# Patient Record
Sex: MALE | Race: WHITE | NOT HISPANIC OR LATINO | Employment: FULL TIME | URBAN - METROPOLITAN AREA
[De-identification: names, ages, dates, MRNs, and addresses within clinical notes are randomized per-mention and may not be internally consistent; named-entity substitution may affect disease eponyms.]

---

## 2017-11-11 ENCOUNTER — HOSPITAL ENCOUNTER (EMERGENCY)
Facility: CLINIC | Age: 32
Discharge: HOME | End: 2017-11-12
Attending: EMERGENCY MEDICINE | Admitting: EMERGENCY MEDICINE
Payer: COMMERCIAL

## 2017-11-11 DIAGNOSIS — R52 PAIN: ICD-10-CM

## 2017-11-11 PROCEDURE — 93010 ELECTROCARDIOGRAM REPORT: CPT | Mod: Z6 | Performed by: EMERGENCY MEDICINE

## 2017-11-11 PROCEDURE — 93005 ELECTROCARDIOGRAM TRACING: CPT | Performed by: EMERGENCY MEDICINE

## 2017-11-11 PROCEDURE — 99285 EMERGENCY DEPT VISIT HI MDM: CPT | Mod: 25 | Performed by: EMERGENCY MEDICINE

## 2017-11-11 PROCEDURE — 99284 EMERGENCY DEPT VISIT MOD MDM: CPT | Mod: 25 | Performed by: EMERGENCY MEDICINE

## 2017-11-11 PROCEDURE — 96361 HYDRATE IV INFUSION ADD-ON: CPT | Performed by: EMERGENCY MEDICINE

## 2017-11-11 NOTE — ED AVS SNAPSHOT
[facility], [place], Emergency Department    [address] [place] [address]    Phone:  [phone]                                       Kenton Greco   MRN: [id number]    Department:  [facility], Emergency Department   Date of Visit:  11/11/2017           After Visit Summary Signature Page     I have received my discharge instructions, and my questions have been answered. I have discussed any challenges I see with this plan with the nurse or doctor.    ..........................................................................................................................................  Patient/Patient Representative Signature      ..........................................................................................................................................  Patient Representative Print Name and Relationship to Patient    ..................................................               ................................................  Date                                            Time    ..........................................................................................................................................  Reviewed by Signature/Title    ...................................................              ..............................................  Date                                                            Time

## 2017-11-11 NOTE — ED AVS SNAPSHOT
[facility], Emergency Department    [address] [address]    Phone:  [phone]                                       Kenton Greco   MRN: [id number]    Department:  [facility], Emergency Department   Date of Visit:  11/11/2017           Patient Information     Date Of Birth          1985        Your diagnoses for this visit were:     Pain right low ribcage/R upper abdominal discomfort       You were seen by Venessa Fernando MD.        Discharge Instructions       You have been seen in the ER for right sided ribcage pain/right upper abdominal pain.  You have had blood tests, an EKG, chest X ray and an ultrasound, all of which appear to be normal.      It is not precisely clear what is causing your symptoms at this time, though it could be due to muscle strain.  Keep an eye out for a rash that could develop - this would look like little blisters that develop in a line or a strip. If you see this, it could mean that you are developing shingles.  Follow up with your clinic right away if you see this so they can start you on anti-viral treatment.    You have decided not to wait for the urine test result.    If you develop fevers, uncontrolled symptoms, or repeated vomiting, come back to the ER or see your clinic right away.     24 Hour Appointment Hotline       To make an appointment at any [facility], call [phone] ([phone]). If you don't have a family doctor or clinic, we will help you find one. [facility] are conveniently located to serve the needs of you and your family.             Review of your medicines      Notice     You have not been prescribed any medications.            Procedures and tests performed during your visit     Abdomen US, limited (RUQ only)    CBC with platelets differential    Chest XR,  PA & LAT    Comprehensive metabolic panel    EKG 12 lead    Lipase    UA with Microscopic reflex to Culture      Orders Needing Specimen Collection  "    None      Pending Results     Date and Time Order Name Status Description    2017 Abdomen US, limited (RUQ only) Preliminary     2017 EKG 12 lead Preliminary     2017 Chest XR,  PA & LAT Preliminary             Pending Culture Results     No orders found for last 3 day(s).            Pending Results Instructions     If you had any lab results that were not finalized at the time of your Discharge, you can call the ED Lab Result RN at [phone]. You will be contacted by this team for any positive Lab results or changes in treatment. The nurses are available 7 days a week from 10A to 6:30P.  You can leave a message 24 hours per day and they will return your call.        Thank you for choosing [facility]       Thank you for choosing [facility] for your care. Our goal is always to provide you with excellent care. Hearing back from our patients is one way we can continue to improve our services. Please take a few minutes to complete the written survey that you may receive in the mail after you visit with us. Thank you!        [employer] Information     [employer] lets you send messages to your doctor, view your test results, renew your prescriptions, schedule appointments and more. To sign up, go to [email or web address] . Click on \"Log in\" on the left side of the screen, which will take you to the Welcome page. Then click on \"Sign up Now\" on the right side of the page.     You will be asked to enter the access code listed below, as well as some personal information. Please follow the directions to create your username and password.     Your access code is: [email or web address]  Expires: 2/10/2018  2:48 AM     Your access code will  in 90 days. If you need help or a new code, please call your [facility] clinic or [phone].        Care EveryWhere ID     This is your Care EveryWhere ID. This could be used by other organizations to access your [facility] medical records  [id number]      "   Equal Access to Services     LAUREN ARREDONDO : Loretta Kimbrough, raman gatica, linda vang. So [facility] [phone].    ATENCIÓN: Si habla español, tiene a jane disposición servicios gratuitos de asistencia lingüística. Llame al [phone].    We comply with applicable federal civil rights laws and [place] laws. We do not discriminate on the basis of race, color, national origin, age, disability, sex, sexual orientation, or gender identity.            After Visit Summary       This is your record. Keep this with you and show to your community pharmacist(s) and doctor(s) at your next visit.

## 2017-11-12 ENCOUNTER — APPOINTMENT (OUTPATIENT)
Dept: ULTRASOUND IMAGING | Facility: CLINIC | Age: 32
End: 2017-11-12
Attending: EMERGENCY MEDICINE
Payer: COMMERCIAL

## 2017-11-12 ENCOUNTER — APPOINTMENT (OUTPATIENT)
Dept: GENERAL RADIOLOGY | Facility: CLINIC | Age: 32
End: 2017-11-12
Attending: EMERGENCY MEDICINE
Payer: COMMERCIAL

## 2017-11-12 VITALS
RESPIRATION RATE: 18 BRPM | BODY MASS INDEX: 30.48 KG/M2 | TEMPERATURE: 97.7 F | DIASTOLIC BLOOD PRESSURE: 80 MMHG | WEIGHT: 225 LBS | HEART RATE: 82 BPM | OXYGEN SATURATION: 95 % | SYSTOLIC BLOOD PRESSURE: 112 MMHG | HEIGHT: 72 IN

## 2017-11-12 LAB
ALBUMIN SERPL-MCNC: 4 G/DL (ref 3.4–5)
ALBUMIN UR-MCNC: NEGATIVE MG/DL
ALP SERPL-CCNC: 79 U/L (ref 40–150)
ALT SERPL W P-5'-P-CCNC: 41 U/L (ref 0–70)
ANION GAP SERPL CALCULATED.3IONS-SCNC: 5 MMOL/L (ref 3–14)
APPEARANCE UR: CLEAR
AST SERPL W P-5'-P-CCNC: 22 U/L (ref 0–45)
BASOPHILS # BLD AUTO: 0 10E9/L (ref 0–0.2)
BASOPHILS NFR BLD AUTO: 0.2 %
BILIRUB SERPL-MCNC: 0.4 MG/DL (ref 0.2–1.3)
BILIRUB UR QL STRIP: NEGATIVE
BUN SERPL-MCNC: 13 MG/DL (ref 7–30)
CALCIUM SERPL-MCNC: 9 MG/DL (ref 8.5–10.1)
CHLORIDE SERPL-SCNC: 104 MMOL/L (ref 94–109)
CO2 SERPL-SCNC: 31 MMOL/L (ref 20–32)
COLOR UR AUTO: YELLOW
CREAT SERPL-MCNC: 0.91 MG/DL (ref 0.66–1.25)
DIFFERENTIAL METHOD BLD: NORMAL
EOSINOPHIL # BLD AUTO: 0.1 10E9/L (ref 0–0.7)
EOSINOPHIL NFR BLD AUTO: 1.7 %
ERYTHROCYTE [DISTWIDTH] IN BLOOD BY AUTOMATED COUNT: 12.7 % (ref 10–15)
GFR SERPL CREATININE-BSD FRML MDRD: >90 ML/MIN/1.7M2
GLUCOSE SERPL-MCNC: 89 MG/DL (ref 70–99)
GLUCOSE UR STRIP-MCNC: NEGATIVE MG/DL
HCT VFR BLD AUTO: 45 % (ref 40–53)
HGB BLD-MCNC: 15.3 G/DL (ref 13.3–17.7)
HGB UR QL STRIP: NEGATIVE
IMM GRANULOCYTES # BLD: 0 10E9/L (ref 0–0.4)
IMM GRANULOCYTES NFR BLD: 0.1 %
INTERPRETATION ECG - MUSE: NORMAL
KETONES UR STRIP-MCNC: NEGATIVE MG/DL
LEUKOCYTE ESTERASE UR QL STRIP: NEGATIVE
LIPASE SERPL-CCNC: 108 U/L (ref 73–393)
LYMPHOCYTES # BLD AUTO: 2.9 10E9/L (ref 0.8–5.3)
LYMPHOCYTES NFR BLD AUTO: 35.9 %
MCH RBC QN AUTO: 30 PG (ref 26.5–33)
MCHC RBC AUTO-ENTMCNC: 34 G/DL (ref 31.5–36.5)
MCV RBC AUTO: 88 FL (ref 78–100)
MONOCYTES # BLD AUTO: 0.7 10E9/L (ref 0–1.3)
MONOCYTES NFR BLD AUTO: 8.5 %
MUCOUS THREADS #/AREA URNS LPF: PRESENT /LPF
NEUTROPHILS # BLD AUTO: 4.3 10E9/L (ref 1.6–8.3)
NEUTROPHILS NFR BLD AUTO: 53.6 %
NITRATE UR QL: NEGATIVE
NRBC # BLD AUTO: 0 10*3/UL
NRBC BLD AUTO-RTO: 0 /100
PH UR STRIP: 6.5 PH (ref 5–7)
PLATELET # BLD AUTO: 213 10E9/L (ref 150–450)
POTASSIUM SERPL-SCNC: 3.8 MMOL/L (ref 3.4–5.3)
PROT SERPL-MCNC: 7.4 G/DL (ref 6.8–8.8)
RBC # BLD AUTO: 5.1 10E12/L (ref 4.4–5.9)
RBC #/AREA URNS AUTO: 1 /HPF (ref 0–2)
SODIUM SERPL-SCNC: 139 MMOL/L (ref 133–144)
SOURCE: ABNORMAL
SP GR UR STRIP: 1.01 (ref 1–1.03)
UROBILINOGEN UR STRIP-MCNC: NORMAL MG/DL (ref 0–2)
WBC # BLD AUTO: 8.1 10E9/L (ref 4–11)
WBC #/AREA URNS AUTO: 1 /HPF (ref 0–2)

## 2017-11-12 PROCEDURE — 71020 XR CHEST 2 VW: CPT

## 2017-11-12 PROCEDURE — 76705 ECHO EXAM OF ABDOMEN: CPT

## 2017-11-12 PROCEDURE — 96374 THER/PROPH/DIAG INJ IV PUSH: CPT | Performed by: EMERGENCY MEDICINE

## 2017-11-12 PROCEDURE — 25000128 H RX IP 250 OP 636: Performed by: EMERGENCY MEDICINE

## 2017-11-12 PROCEDURE — 85025 COMPLETE CBC W/AUTO DIFF WBC: CPT | Performed by: EMERGENCY MEDICINE

## 2017-11-12 PROCEDURE — 80053 COMPREHEN METABOLIC PANEL: CPT | Performed by: EMERGENCY MEDICINE

## 2017-11-12 PROCEDURE — 83690 ASSAY OF LIPASE: CPT | Performed by: EMERGENCY MEDICINE

## 2017-11-12 PROCEDURE — 81001 URINALYSIS AUTO W/SCOPE: CPT | Performed by: EMERGENCY MEDICINE

## 2017-11-12 RX ORDER — KETOROLAC TROMETHAMINE 30 MG/ML
30 INJECTION, SOLUTION INTRAMUSCULAR; INTRAVENOUS ONCE
Status: COMPLETED | OUTPATIENT
Start: 2017-11-12 | End: 2017-11-12

## 2017-11-12 RX ADMIN — KETOROLAC TROMETHAMINE 30 MG: 30 INJECTION, SOLUTION INTRAMUSCULAR at 00:33

## 2017-11-12 RX ADMIN — SODIUM CHLORIDE 500 ML: 9 INJECTION, SOLUTION INTRAVENOUS at 00:33

## 2017-11-12 ASSESSMENT — ENCOUNTER SYMPTOMS
CHILLS: 0
FEVER: 0
RHINORRHEA: 0
COUGH: 0
ABDOMINAL PAIN: 1
VOMITING: 0
MYALGIAS: 0
SHORTNESS OF BREATH: 0
DIARRHEA: 0
WHEEZING: 0
NAUSEA: 0
SORE THROAT: 0

## 2017-11-12 NOTE — ED PROVIDER NOTES
History   No chief complaint on file.    HPI  Kenton Greco is a 32 year old male with a history of anxiety, who presents to the Emergency Department with right sided ribcage pain/right upper abdominal pain. The patient complains of intermittent right lateral side pain that radiates to the right upper abdomen, that is more severe with deep breaths, for the past couple of days. He notes that the pain is dull currently. The patient reports that he felt uncomfortable earlier today while sitting during a play, but noticed that the pain progressively got worse once he got up to leave, which prompted his arrival to the ED. However it seems to have spontaneously abated now and he feels much improved. He denies any nausea, vomiting, diarrhea, fever, chills, rashes, central or left sided chest pain, history of any surgeries or history of similar symptoms. He reports that his last bowel movement was today around 4pm, however, he feels the onset of constipation. Of note, the patient reports of a family history of gall stones.     I have reviewed the Medications, Allergies, Past Medical and Surgical History, and Social History in the [email or web address] system.  PAST MEDICAL HISTORY:   Past Medical History:   Diagnosis Date     Anxiety        PAST SURGICAL HISTORY:   Past Surgical History:   Procedure Laterality Date     DENTAL SURGERY       ORTHOPEDIC SURGERY         FAMILY HISTORY: No family history on file.    SOCIAL HISTORY:   Social History   Substance Use Topics     Smoking status: Former Smoker     Smokeless tobacco: Not on file     Alcohol use Yes      Comment: sometimes     No current facility-administered medications for this encounter.      No current outpatient prescriptions on file.      Not on File      Review of Systems   Constitutional: Negative for chills and fever.   HENT: Negative for rhinorrhea and sore throat.    Respiratory: Negative for cough, shortness of breath and wheezing.    Cardiovascular: Negative for chest  pain.        Right lateral ribcage pain   Gastrointestinal: Positive for abdominal pain. Negative for diarrhea, nausea and vomiting.   Musculoskeletal: Negative for myalgias.   Skin: Negative for rash.   All other systems reviewed and are negative.      Physical Exam   BP: (!) 131/91  Pulse: 82  Temp: 97.7  F (36.5  C)  Resp: 18  Height: 182.9 cm (6')  Weight: 102.1 kg (225 lb)  SpO2: 99 %      Physical Exam   Constitutional: No distress.   Well appearing adult male, alert, cooperative, NAD   HENT:   Head: Normocephalic and atraumatic.   Mouth/Throat: Oropharynx is clear and moist. No oropharyngeal exudate.   Eyes: Pupils are equal, round, and reactive to light. No scleral icterus.   Cardiovascular: Normal rate, regular rhythm, normal heart sounds and intact distal pulses.    No murmur heard.  Pulmonary/Chest: Effort normal and breath sounds normal. No respiratory distress. He has no wheezes. He has no rales.   Abdominal: Soft. Bowel sounds are normal. He exhibits no distension. There is no tenderness. There is no rebound.   Slightly obese, soft, no focal TTP upon abdominal exam (though patient reports the RUQ is the location of the discomfort, but palpation does not worsen this)   Musculoskeletal: He exhibits no edema or tenderness.   Skin: Skin is warm. No rash noted. He is not diaphoretic.   Psychiatric:   Reactive affect, joking   Nursing note and vitals reviewed.      ED Course     ED Course     Procedures             EKG Interpretation:      Interpreted by Venessa Fernando  Time reviewed: 0030  Symptoms at time of EKG: RUQ/low chest pain   Rhythm: normal sinus   Rate: normal  Axis: normal  Ectopy: none  Conduction: normal  ST Segments/ T Waves: No ST-T wave changes  Q Waves: none  Comparison to prior: Unchanged    Clinical Impression: normal EKG      Critical Care time:  none           Results for orders placed or performed during the hospital encounter of 11/11/17 (from the past 24 hour(s))   CBC with  platelets differential   Result Value Ref Range    WBC 8.1 4.0 - 11.0 10e9/L    RBC Count 5.10 4.4 - 5.9 10e12/L    Hemoglobin 15.3 13.3 - 17.7 g/dL    Hematocrit 45.0 40.0 - 53.0 %    MCV 88 78 - 100 fl    MCH 30.0 26.5 - 33.0 pg    MCHC 34.0 31.5 - 36.5 g/dL    RDW 12.7 10.0 - 15.0 %    Platelet Count 213 150 - 450 10e9/L    Diff Method Automated Method     % Neutrophils 53.6 %    % Lymphocytes 35.9 %    % Monocytes 8.5 %    % Eosinophils 1.7 %    % Basophils 0.2 %    % Immature Granulocytes 0.1 %    Nucleated RBCs 0 0 /100    Absolute Neutrophil 4.3 1.6 - 8.3 10e9/L    Absolute Lymphocytes 2.9 0.8 - 5.3 10e9/L    Absolute Monocytes 0.7 0.0 - 1.3 10e9/L    Absolute Eosinophils 0.1 0.0 - 0.7 10e9/L    Absolute Basophils 0.0 0.0 - 0.2 10e9/L    Abs Immature Granulocytes 0.0 0 - 0.4 10e9/L    Absolute Nucleated RBC 0.0    Comprehensive metabolic panel   Result Value Ref Range    Sodium 139 133 - 144 mmol/L    Potassium 3.8 3.4 - 5.3 mmol/L    Chloride 104 94 - 109 mmol/L    Carbon Dioxide 31 20 - 32 mmol/L    Anion Gap 5 3 - 14 mmol/L    Glucose 89 70 - 99 mg/dL    Urea Nitrogen 13 7 - 30 mg/dL    Creatinine 0.91 0.66 - 1.25 mg/dL    GFR Estimate >90 >60 mL/min/1.7m2    GFR Estimate If Black >90 >60 mL/min/1.7m2    Calcium 9.0 8.5 - 10.1 mg/dL    Bilirubin Total 0.4 0.2 - 1.3 mg/dL    Albumin 4.0 3.4 - 5.0 g/dL    Protein Total 7.4 6.8 - 8.8 g/dL    Alkaline Phosphatase 79 40 - 150 U/L    ALT 41 0 - 70 U/L    AST 22 0 - 45 U/L   Lipase   Result Value Ref Range    Lipase 108 73 - 393 U/L   EKG 12 lead   Result Value Ref Range    Interpretation ECG Click View Image link to view waveform and result    Chest XR,  PA & LAT    Narrative    Exam: Two-view chest x-ray, 11/12/2017 12:39 AM    Indication: Chest pain    Comparison: None    Findings:   PA and lateral views of the chest. No focal airspace opacities. No  pneumothorax or pleural effusion. Heart size is normal.       Impression    Impression:    No acute  cardiopulmonary findings.   Abdomen US, limited (RUQ only)    Narrative    EXAMINATION: Limited Abdominal Ultrasound, 11/12/2017 1:28 AM     COMPARISON: None.    HISTORY: Low chest, right upper quadrant pain    FINDINGS:   Fluid: No evidence of ascites or pleural effusions.    Liver: The liver demonstrates normal echotexture, measuring 16.5 cm in  craniocaudal dimension. There is no focal mass.     Gallbladder: The gallbladder is decompressed. There is no wall  thickening, pericholecystic fluid, positive sonographic De Leon's sign  or evidence for cholelithiasis.    Bile Ducts: Both the intra- and extrahepatic biliary system are of  normal caliber.  The common bile duct measures 3 mm in diameter.    Pancreas: Visualized portions of the head and body of the pancreas are  unremarkable.     Kidney: The right kidney measures 12.9 cm long. There is no  hydronephrosis or hydroureter, no shadowing renal calculi, cystic  lesion or mass.       Impression    IMPRESSION:   Normal right upper quadrant ultrasound.              Assessments & Plan (with Medical Decision Making)   This is a previously healthy 32-year-old male who presents to the Emergency Department today complaining of some right low chest pain/right upper quadrant abdominal pain which has been going on since yesterday. Seems to be worse with deep breaths. No other associated symptoms.     Differential diagnosis was extensive.  Certainly need to consider the possibility of simple musculoskeletal strain which is possible.  Gallbladder etiology/biliary colic is possible though this certainly is not classic story for it.  He does however report two family members have recently had issues with gallstones and wonders if he may have the same thing.  Very unlikely to be ACS, though this still needs to be considered. Pancreatitis or hepatitis would be possible. Pneumonia causing pleural irritation would be possible though again I think unlikely. Pneumothorax I think is  unlikely. He is PERC rule negative so no need to do further evaluation for PE given very low risk status. Early/developing zoster would be possible.    We did establish IV access and we did draw blood for laboratory analysis. CBC is within normal limits, CMP is within normal limits, lipase also within normal limits.   Chest x-ray was done and this demonstrates no acute cardiopulmonary findings, abdominal ultrasound was done to evaluate the gallbladder and this demonstrates a normal right upper quadrant ultrasound.  Patient was given IV fluids as well as a dose of Toradol.  After this the patient felt improved.    The etiology of his symptoms is not clear at this point, however I believe we have ruled out serious pathology.  Particularly as he is feeling better I think it is safe to discharge him.  I did warn him to watch for the development of any rash or blisters that might portend developing zoster.  This could explain pain, though again at this point he has no rash.  Certainly if he develops fever, severe pain, vomiting, or other new or concerning symptoms he should come back to the emergency department or follow-up with his clinic right away.  Patient seems relieved that his workup is negative follow-up as directed.    I have reviewed the nursing notes.    I have reviewed the findings, diagnosis, plan and need for follow up with the patient.  This part of the document was transcribed by Steff Thornton Medical Scribe.   New Prescriptions    No medications on file       Final diagnoses:   Pain - right low ribcage/R upper abdominal discomfort     I, Mary Campa, am serving as a trained medical scribe to document services personally performed by Venessa Fernando MD, based on the provider's statements to me.   I, Venessa Fernando MD, was physically present and have reviewed and verified the accuracy of this note documented by Mary Campa.    11/11/2017   [facility], EMERGENCY DEPARTMENT     Abdullahi  Venessa Espino MD  11/12/17 0201

## 2017-11-12 NOTE — ED NOTES
Pt arrived to the ER with new onset of RUQ abdominal pain that started yesterday but got worse today. Denies nausea/vomiting or diarrhea. VSS and afebrile. Denies chest pain and or SOA.

## 2017-11-12 NOTE — DISCHARGE INSTRUCTIONS
You have been seen in the ER for right sided ribcage pain/right upper abdominal pain.  You have had blood tests, an EKG, chest X ray and an ultrasound, all of which appear to be normal.      It is not precisely clear what is causing your symptoms at this time, though it could be due to muscle strain.  Keep an eye out for a rash that could develop - this would look like little blisters that develop in a line or a strip. If you see this, it could mean that you are developing shingles.  Follow up with your clinic right away if you see this so they can start you on anti-viral treatment.    You have decided not to wait for the urine test result.    If you develop fevers, uncontrolled symptoms, or repeated vomiting, come back to the ER or see your clinic right away.

## 2024-09-10 ENCOUNTER — OFFICE VISIT (OUTPATIENT)
Dept: URGENT CARE | Facility: URGENT CARE | Age: 39
End: 2024-09-10

## 2024-09-10 VITALS
HEART RATE: 56 BPM | OXYGEN SATURATION: 98 % | TEMPERATURE: 97.7 F | SYSTOLIC BLOOD PRESSURE: 118 MMHG | BODY MASS INDEX: 32.82 KG/M2 | WEIGHT: 242 LBS | DIASTOLIC BLOOD PRESSURE: 64 MMHG | RESPIRATION RATE: 18 BRPM

## 2024-09-10 DIAGNOSIS — H81.10 BENIGN PAROXYSMAL POSITIONAL VERTIGO, UNSPECIFIED LATERALITY: Primary | ICD-10-CM

## 2024-09-10 DIAGNOSIS — J01.10 ACUTE NON-RECURRENT FRONTAL SINUSITIS: ICD-10-CM

## 2024-09-10 PROCEDURE — 99203 OFFICE O/P NEW LOW 30 MIN: CPT | Performed by: PHYSICIAN ASSISTANT

## 2024-09-10 RX ORDER — MECLIZINE HYDROCHLORIDE 25 MG/1
25-50 TABLET ORAL 3 TIMES DAILY PRN
Qty: 30 TABLET | Refills: 0 | Status: SHIPPED | OUTPATIENT
Start: 2024-09-10 | End: 2024-10-10

## 2024-09-10 NOTE — PROGRESS NOTES
Assessment & Plan     Benign paroxysmal positional vertigo, unspecified laterality  Discussed etiology and typical treatment.    Meclizine as needed or if frequent may use to suppress vestibular sx for 2-3 days.  PT referral.    PI given and discussed.    - meclizine (ANTIVERT) 25 MG tablet  Dispense: 30 tablet; Refill: 0  - Physical Therapy  Referral    Acute non-recurrent frontal sinusitis  Augmentin as ordered.     Push fluids, rest and ibuprofen or tylenol for comfort.    RTC for persistent or worsening sx.     - amoxicillin-clavulanate (AUGMENTIN) 875-125 MG tablet  Dispense: 14 tablet; Refill: 0      Sarita Thomas PA-C  [facility] SE[facility] Fung is a 39 year old male who presents to clinic today for the following health issues:  Chief Complaint   Patient presents with    Urgent Care     Sinus infection, vertigo.Started 2 weeks ago.        HPI  Pt developed uri about 2 weeks ago. Improved some but lingering cough and sinus pressure, drainage . Developed room spinning dizziness starting yesterday.    Notes room spinning most with position change from side to  back in bed. Looking over shoulder.   Also with walking down [address] one day felt walking to the side. No tinnitus. No hearing changes.    Has ongoing pressure in the face, eyes.    Congestion, HA.  Drainage is smaller amount thin.    Lots of discharge in the morning, green.    No fevers.    Covid 19 test early on was negative.         Review of Systems  Constitutional, HEENT, cardiovascular, pulmonary, gi and gu systems are negative, except as otherwise noted.      There is no problem list on file for this patient.      Objective    /64   Pulse 56   Temp 97.7  F (36.5  C) (Oral)   Resp 18   Wt 109.8 kg (242 lb)   SpO2 98%   BMI 32.82 kg/m    Physical Exam   Pt is in no acute distress and appears well  Ears patent B:  TM s intact, non-injected. All land marks easily visibile    Nasal mucosa is  non-edematous, no discharge.    Pharynx: non erythematous, tonsils non hypertrophied, No exudate   Neck supple: no adenopathy  TTP of the maxillary and frontal sinuses.   Lungs: CTA  Heart: RRR, no murmur, no thrills or heaves   Ext: no edema  Skin: no rashes    Deferred baldemar hallpike maneuver